# Patient Record
Sex: FEMALE | Employment: UNEMPLOYED | URBAN - METROPOLITAN AREA
[De-identification: names, ages, dates, MRNs, and addresses within clinical notes are randomized per-mention and may not be internally consistent; named-entity substitution may affect disease eponyms.]

---

## 2018-02-21 ENCOUNTER — OFFICE VISIT (OUTPATIENT)
Dept: SURGERY | Age: 43
End: 2018-02-21

## 2018-02-21 VITALS
BODY MASS INDEX: 21.34 KG/M2 | HEART RATE: 98 BPM | DIASTOLIC BLOOD PRESSURE: 53 MMHG | HEIGHT: 66 IN | WEIGHT: 132.8 LBS | SYSTOLIC BLOOD PRESSURE: 89 MMHG

## 2018-02-21 DIAGNOSIS — Z98.84 S/P BARIATRIC SURGERY: ICD-10-CM

## 2018-02-21 DIAGNOSIS — R10.11 RUQ ABDOMINAL PAIN: ICD-10-CM

## 2018-02-21 DIAGNOSIS — K90.9 INTESTINAL MALABSORPTION, UNSPECIFIED TYPE: ICD-10-CM

## 2018-02-21 DIAGNOSIS — R10.11 RUQ ABDOMINAL PAIN: Primary | ICD-10-CM

## 2018-02-21 DIAGNOSIS — R11.0 POSTPRANDIAL NAUSEA: ICD-10-CM

## 2018-02-21 RX ORDER — CYANOCOBALAMIN 1000 UG/ML
1000 INJECTION, SOLUTION INTRAMUSCULAR; SUBCUTANEOUS
Qty: 3 VIAL | Refills: 3 | Status: SHIPPED | OUTPATIENT
Start: 2018-02-21 | End: 2018-10-10 | Stop reason: SDUPTHER

## 2018-02-21 RX ORDER — IBUPROFEN 800 MG/1
800 TABLET ORAL
Qty: 60 TAB | Refills: 0 | Status: SHIPPED | OUTPATIENT
Start: 2018-02-21 | End: 2020-10-01

## 2018-02-21 NOTE — PATIENT INSTRUCTIONS
Patient Instructions      1. Remember hydration goals - minimum of 64 ounces of liquids per day (dehydration is the number one reason for hospital readmission). 2. Continue to monitor carbohydrate and protein intake you need a minimum of  Grams of protein daily- remember to keep your total carbohydrates to 50 grams or less per day for best results. 3. Continue to work towards exercise goals - 60-90 minutes, 5 times a week minimum of deliberate, aerobic exercise is the ultimate goal with strength training 2 times each week. Refer to [facility] for  information. 4. Remember to take vitamins as directed in your handbook. 5. Attend support group the 2nd Thursday of each month. 6. Use Miralax if you become constipated. 7. Call us at [phone] or email us through [email or web address] with questions,     concerns or worsening of condition, we have someone on call 24 hours a day. If you are unable to reach our office, you are to go to your Primary Care Physician or the Emergency Department. Supplement Resource Guide    Importance of Protein:   Maintains lean body mass, produces antibodies to fight off infections, heals wounds, minimizes hair loss, helps to give you energy, helps with satiety, and keeping you full between meals. Importance of Calcium:  Needed for healthy bones and teeth, normal blood clotting, and nervous system functioning, higher risk of osteoporosis and bone disease with non-compliance. Importance of Multivitamins: Many functions. Supply you with extra nutrients that you may be missing from food. May lead to iron deficiency anemia, weakness, fatigue, and many other symptoms with non-compliance. Importance of B Vitamins:  Important for red blood cell formation, metabolism, energy, and helps to maintain a healthy nervous system. Protein Supplement  Find one you like now. Use immediately after surgery. Look for:  35-50g protein each day from your protein supplement once you reach the progression diet. 0-3 g fat per serving  0-3 g sugar per serving    Protein drinks should be split in separate dosages. Recommend: Lifelong  1 year + Calcium Supplement:     Start taking within a month after surgery. Look for: Calcium Citrate Plus D (1500 mg per day)  Recommend: Citracal     .            Avoid chocolate chewable calcium. Can use chewable bariatric or GNC brand or similar chewable. The body cannot absorb more than 500-600 mg of calcium at a time. Take for Life Multi-vitamin Supplement:      Start immediately after surgery: any complete chewable, such as: [place]s Complete chewables. Avoid [place] sours or gummies. They lack iron and other important nutrients and also have added sugar. Continue with chewable vitamin or change to adult complete multivitamin one month after surgery. Menstruating women can take a prenatal vitamin. Make sure has at least 18 mg iron and 089-607 mcg folic acid   Vitamin I06, B Complex Vitamin, and Biotin  Start taking within a month after surgery. Vitamin B12:  1000 mcg of Vitamin B12 three times weekly    Must take sublingually (meaning you take it under your tongue) or in a liquid drop form for easy absorption. B Complex Vitamin: Take a pill or liquid drop form once daily. Biotin: This vitamin can help prevent hair loss.     Recommend 5mg   (5000 mcg) a day  Biotin is Optional

## 2018-02-21 NOTE — PROGRESS NOTES
Surgery Consult  Evaluation of RUQ pain    Subjective:     Margi Huitron is a 38 y.o. female with a history of laparoscopic sleeve gastrectomy on 8/1/14. She complains of ruq pain that started about one month ago. She states that she started her cycle, then noticed the pain, before her cycle was over she moved some furniture, then the pain was more intense the following day and she thought she had strained a muscle. Eating and drinking makes the pain worse and causes nausea. Motrin and Tylenol make the pain tolerable. She states that when she eats, it feels like her right side is bulging and when she rubs it the pain seems to move to her umbilical region. Pain is a 7/10 and feels like an electrical shock when she eats or twists her torso to the right side. When the symptoms first started, she developed diarrhea, but through the course of the last month, it has turned to constipation. Patient had developed a fear of eating or drinking due to the associated pain and nausea. Patient was seen at [facility] on 2/14/18 for this problem initially and was diagnosed with possible muscle strain. No imaging was preformed at the time. Patient has not been compliant with bariatric surgery follow up. Last office visit was 8/22/14. Patient Active Problem List    Diagnosis Date Noted    RUQ abdominal pain 02/21/2018    Postprandial nausea 02/21/2018    S/P bariatric surgery 08/11/2014    Intestinal malabsorption 08/11/2014    Arthritis     Chronic pain     Morbid obesity ([address]     Sleep apnea      Past Medical History:   Diagnosis Date    Arthritis     Chronic pain     lower pain    Diabetes ([address] 2013    GERD (gastroesophageal reflux disease)     hx of. not a problem now.  denies current symptoms    GERD (gastroesophageal reflux disease)     Morbid obesity (HCC)     Other ill-defined conditions(799.89)     sciatica    Other ill-defined conditions(799.89)     carpal tunnel    Other ill-defined conditions(799.89)     pinched nerve in cervical    Other ill-defined conditions(799.89)     fibromyalgia    Other ill-defined conditions(799.89)     h/o migraines    Psychiatric disorder     h/o anxiety, panic attack, depression    Sleep apnea     does not use c-pap      Past Surgical History:   Procedure Laterality Date    HX CARPAL TUNNEL RELEASE      bilateral    HX ORTHOPAEDIC      carpal tunnel bilateral    [place] LAP, JAQUAN RESTRICT PROC, LONGITUDINAL GASTRECTOMY  8/1/14    Sleeve by Dr Lincoln Rosales History   Substance Use Topics    Smoking status: Former Smoker     Packs/day: 0.25     Years: 5.00     Quit date: 11/1/2011    Smokeless tobacco: Never Used    Alcohol use No      Family History   Problem Relation Age of Onset    Malignant Hyperthermia Neg Hx     Pseudocholinesterase Deficiency Neg Hx     Delayed Awakening Neg Hx     Post-op Nausea/Vomiting Neg Hx     Post-op Cognitive Dysfunction Neg Hx     Emergence Delirium Neg Hx       Current Outpatient Prescriptions   Medication Sig    Syringe with Needle, Safety (3CC SAFETY SYRINGE 25GX5/8\") 3 mL 25 gauge x 5/8\" syrg 1 mL by Does Not Apply route every thirty (30) days.  cyanocobalamin (VITAMIN B-12) 1,000 mcg/mL injection 1 mL by SubCUTAneous route every thirty (30) days. Indications: PREVENTION OF VITAMIN B12 DEFICIENCY    ibuprofen (MOTRIN) 800 mg tablet Take 1 Tab by mouth every six (6) hours as needed for Pain.  ondansetron (ZOFRAN ODT) 4 mg disintegrating tablet Take 1 tablet by mouth every eight (8) hours as needed for Nausea.  cyanocobalamin (VITAMIN B12) 1,000 mcg/mL injection 1 mL by SubCUTAneous route every thirty (30) days.  flintstones complete (FLINTSTONES COMPLETE) chewable tablet Take 1 Tab by mouth daily.  zolpidem (AMBIEN) 5 mg tablet Take 5 mg by mouth nightly. No current facility-administered medications for this visit.        Allergies   Allergen Reactions    Latex Hives    Penicillins Hives    Vicodin [Hydrocodone-Acetaminophen] Swelling     throat        Review of Systems:  Positive in BOLD    CONST: Fever, weight loss, fatigue or chills  GI: Nausea, vomiting, abdominal pain, change in bowel habits, hematochezia, melena, and GERD   INTEG: Dermatitis, abnormal moles  HEENT: Recent changes in vision, vertigo, epistaxis, dysphagia and hoarseness  CV: Chest pain, palpitations, HTN, edema and varicosities  RESP: Cough, shortness of breath, wheezing, hemoptysis, snoring and reactive airway disease  : Hematuria, dysuria, frequency, urgency, nocturia and stress urinary incontinence   MS: Weakness, joint pain and arthritis  ENDO: Diabetes, thyroid disease, polyuria, polydipsia, polyphagia, poor wound healing, heat intolerance, cold intolerance  LYMPH/HEME: Anemia, bruising and history of blood transfusions  NEURO: Dizziness, headache, fainting, seizures and stroke  PSYCH: Anxiety and depression    Objective:     Visit Vitals    BP (!) 89/53 (BP 1 Location: Left arm, BP Patient Position: Sitting)    Pulse 98    Ht 5' 6\" (1.676 m)    Wt 60.2 kg (132 lb 12.8 oz)    BMI 21.43 kg/m2       Physical Exam:      GENERAL: alert, cooperative, no distress, appears stated age  EYE:conjunctivae and sclerae normal, pupils equal, round, reactive to light, extraocular movements intact without nystagmus  THROAT & NECK: normal, neck soft and supple, no lymphadenopathy  LUNG: clear to auscultation bilaterally  HEART: Regular rate and rhythm or without murmur or extra heart sounds  ABDOMEN:Davis sign not present, moderate tenderness in the in the RUQ., no CVA tenderness, no hernias noted  EXTREMITIES:  extremities normal, atraumatic, no cyanosis or edema  SKIN: Normal.    Imaging and Lab Review:   Findings of ultrasound of the abdomen: pending. No results found for this or any previous visit (from the past 24 hour(s)).         Assessment:   RUQ pain   Postprandial nausea    Plan:   Labs   US of RUQ to assess gallbladder  Will order HIDA scan with [facility] if US is normal  Will call patient with results    Patient is to return to clinic if symptoms worsen or if she has any questions or concerns. If clinic is closed, she is to proceed to the nearest emergency department.      Signed By: Giana Foy, [id number]     February 21, 2018

## 2018-10-10 ENCOUNTER — TELEPHONE (OUTPATIENT)
Dept: SURGERY | Age: 43
End: 2018-10-10

## 2018-10-10 DIAGNOSIS — K90.9 INTESTINAL MALABSORPTION, UNSPECIFIED TYPE: ICD-10-CM

## 2018-10-10 DIAGNOSIS — R11.0 POSTPRANDIAL NAUSEA: ICD-10-CM

## 2018-10-10 DIAGNOSIS — Z98.84 S/P BARIATRIC SURGERY: ICD-10-CM

## 2018-10-10 DIAGNOSIS — R10.11 RUQ ABDOMINAL PAIN: ICD-10-CM

## 2018-10-10 RX ORDER — CYANOCOBALAMIN 1000 UG/ML
1000 INJECTION, SOLUTION INTRAMUSCULAR; SUBCUTANEOUS
Qty: 3 VIAL | Refills: 3
Start: 2018-10-10 | End: 2019-03-08

## 2019-03-07 ENCOUNTER — TELEPHONE (OUTPATIENT)
Dept: SURGERY | Age: 44
End: 2019-03-07

## 2019-03-07 NOTE — TELEPHONE ENCOUNTER
Chun Merritt please advise:    Patient reported severe right sided abdomial pain. Described constant pulling pain with no relief with prescribed or OTC medications. Recent EGD and Colonoscopy performed and patient advised to f/u with this office. Patient does still have her gallbladder. Scheduled for office visit 3/8/19 @ 11:30.

## 2019-03-07 NOTE — TELEPHONE ENCOUNTER
Please contact patient and tell her that if she has worsening of her symptoms overnight, she should go to the ED at [facility]. Otherwise we will see her tomorrow at her appt.

## 2019-03-08 ENCOUNTER — OFFICE VISIT (OUTPATIENT)
Dept: SURGERY | Age: 44
End: 2019-03-08

## 2019-03-08 VITALS
TEMPERATURE: 98 F | OXYGEN SATURATION: 100 % | BODY MASS INDEX: 23.38 KG/M2 | WEIGHT: 145.5 LBS | HEIGHT: 66 IN | SYSTOLIC BLOOD PRESSURE: 107 MMHG | DIASTOLIC BLOOD PRESSURE: 71 MMHG | HEART RATE: 72 BPM

## 2019-03-08 DIAGNOSIS — K90.9 INTESTINAL MALABSORPTION, UNSPECIFIED TYPE: Primary | ICD-10-CM

## 2019-03-08 DIAGNOSIS — R19.8 POSITIVE MURPHY'S SIGN: ICD-10-CM

## 2019-03-08 DIAGNOSIS — Z98.84 S/P BARIATRIC SURGERY: ICD-10-CM

## 2019-03-08 DIAGNOSIS — B37.31 CANDIDIASIS OF VAGINA: ICD-10-CM

## 2019-03-08 RX ORDER — CYANOCOBALAMIN 1000 UG/ML
1000 INJECTION, SOLUTION INTRAMUSCULAR; SUBCUTANEOUS
Qty: 3 VIAL | Refills: 3 | Status: SHIPPED | OUTPATIENT
Start: 2019-03-08 | End: 2020-10-01

## 2019-03-08 RX ORDER — FLUCONAZOLE 150 MG/1
150 TABLET ORAL DAILY
Qty: 1 TAB | Refills: 1 | Status: SHIPPED | OUTPATIENT
Start: 2019-03-08 | End: 2019-03-09

## 2019-03-08 NOTE — PATIENT INSTRUCTIONS
Patient Instructions      1. Remember hydration goals - minimum of 64 ounces of liquids per day (dehydration is the number one reason for hospital readmission). 2. Sleep 7-9 hours each night to keep your metabolism up. 3. Continue to monitor carbohydrate and protein intake you need a minimum of  Grams of protein daily- remember to keep your total carbohydrates to 50 grams or less per day for best results. 4. To maximize weight loss keep your caloric intake between 800-1,200 calories daily. If you are exercising excessively, such as training for a marathon, you need to keep a food log and meet with the dietician so they can advise you on your diet choices, carbohydrate intake and caloric intake. 5. Continue to work towards exercise goals - 60-90 minutes, 5 times a week minimum of deliberate, aerobic exercise is the ultimate goal with strength training 2 times each week. Refer to [facility] for  information. 6. Remember to take vitamins as directed in your handbook. 7. Attend support group the 2nd Thursday of each month. 8. Constipation: Milk of Magnesia is for immediate relief only. Miralax is to be used every day if constipation is a chronic problem. 9. Diarrhea: patients will occasionally develop lactose intolerance after surgery. Check to see if your protein shake has whey in it. If it does try a protein powder or drink that does not have whey and stop all yogurts, cheeses and milks to see if the diarrhea goes away. 10. If you have had labs drawn. We will only call you if you have abnormal results. Otherwise you can access the lab results in \"[facility]\". You will only need the access code the first time you sign on. 11. Call us at [phone] or email us through [email or web address]" with questions,     concerns or worsening of condition, we have someone on call 24 hours a day.   If you are unable to reach our office, you are to go to your Primary Care Physician or the Emergency Department. Supplement Resource Guide    Importance of Protein:   Maintains lean body mass, produces antibodies to fight off infections, heals wounds, minimizes hair loss, helps to give you energy, helps with satiety, and keeping you full between meals. Importance of Calcium:  Needed for healthy bones and teeth, normal blood clotting, and nervous system functioning, higher risk of osteoporosis and bone disease with non-compliance. Importance of Multivitamins: Many functions. Supply you with extra nutrients that you may be missing from food. May lead to iron deficiency anemia, weakness, fatigue, and many other symptoms with non-compliance. Importance of B Vitamins:  Important for red blood cell formation, metabolism, energy, and helps to maintain a healthy nervous system. Protein Supplement  Liquid diet phase: consume 90-100g protein daily. Once you are eating consume  35-50g protein each day from your protein supplement. 0-3 g fat per serving  0-3 g sugar per serving    The body can only absorb 30g of protein at one time, so do not consume more than that at one time. Recommend: Lifelong use Multi-vitamin Supplement:      Start immediately after surgery: any complete chewable, such as: [place]s Complete chewables. Avoid [place] sours or gummies. They lack iron and other important nutrients and also have added sugar. Continue with a chewable vitamin or change to an adult complete multivitamin one month after surgery. Menstruating women can take a prenatal vitamin. Make sure it has at least 18 mg iron and 118-269 mcg folic acid Calcium Supplement:     Start taking within one month after surgery. Look for:   Calcium Citrate Plus D (1500 mg per day)    Recommend: Citracal    Avoid chocolate chewable calcium. Can use chewable bariatric or [employer]NC brand or similar chewable.     The body cannot absorb more than 500-600 mg of calcium at one time. Take for Life     Vitamin B12  B Complex Vitamin    Start taking both within one month after surgery. Vitamin B12 (sublingual): Take 1000 mcg of Vitamin B12 three times weekly    Must take sublingually (meaning you put it under your tongue) or in a liquid drop form for easy absorption. B Complex Vitamin:   Take one pill daily or liquid drop form daily; as directed on bottle.      Take for Life

## 2019-03-08 NOTE — PROGRESS NOTES
Subjective:      David Benitez is a 45 y.o. female is now 4.5 years status post laparoscopic sleeve gastrectomy. Doing well overall. She has lost a total of 86 pounds since surgery. Body mass index is 23.48 kg/m². Has lost 91% of EBW. Currently on a solid food diet without difficulty, reports no issues regarding her weight loss surgery and denies vomiting. Taking in 40oz water daily. Sources of protein include chicken and fish. 60 min of activity 3 days a week, including burpees, russian twists and planks. Patient is sleeping 4-5 hours a night on average. Patient has been having RUQ pain for over a year. She is back today with the same complaints. Pain is constant cramping in RUQ that will worsen with eating or bending over. Nothing makes the pain better. She states that when it worsens it feels like an electric shock in the area. Patient denies trauma to the area. At her last visit in Feb 2018 I had ordered an US of her gall bladder, but she did not complete the study. She did have multiple studies completed at [facility] in June and July of 2018, see results below. A HIDA scan has not been completed. Patient states that she has had a chronic vaginal yeast infection. She will treat it and it will go away for about a month, then it will come right back. She states that she has one now. She has tried a sitz bath in warm water with white vinegar with no relief. She states she is going to call her GYN. Bowel movements are constipated, she has a stool softener, but has not been using it. The patient is not compliant with multivitamins, calcium, Vit D and B12 supplements.      Weight Loss Metrics 3/8/2019 2/21/2018 8/30/2014 8/28/2014 8/21/2014 8/15/2014 8/1/2014   Today's Wt 145 lb 8 oz 132 lb 12.8 oz 212 lb 214 lb 216 lb 217 lb 226 lb   BMI 23.48 kg/m2 21.43 kg/m2 34.23 kg/m2 34.56 kg/m2 34.88 kg/m2 35.04 kg/m2 36.49 kg/m2          Patient Active Problem List   Diagnosis Code    Arthritis M19.90    Chronic pain G89.29    Morbid obesity (HCC) E66.01    Sleep apnea G47.30    S/P bariatric surgery Z98.84    Intestinal malabsorption K90.9    RUQ abdominal pain R10.11    Postprandial nausea R11.0    Vaginal candidiasis B37.3        Past Medical History:   Diagnosis Date    Arthritis     Chronic pain     lower pain    Diabetes ([facility] [address] 2013    GERD (gastroesophageal reflux disease)     hx of. not a problem now. denies current symptoms    GERD (gastroesophageal reflux disease)     Morbid obesity (HCC)     Other ill-defined conditions(799.89)     sciatica    Other ill-defined conditions(799.89)     carpal tunnel    Other ill-defined conditions(799.89)     pinched nerve in cervical    Other ill-defined conditions(799.89)     fibromyalgia    Other ill-defined conditions(799.89)     h/o migraines    Psychiatric disorder     h/o anxiety, panic attack, depression    Sleep apnea     does not use c-pap       Past Surgical History:   Procedure Laterality Date    HX CARPAL TUNNEL RELEASE      bilateral    HX ORTHOPAEDIC      carpal tunnel bilateral    MD LAP, JAQUAN RESTRICT PROC, LONGITUDINAL GASTRECTOMY  8/1/14    Sleeve by Dr Sasha Harris       Current Outpatient Medications   Medication Sig Dispense Refill    B.infantis-B.ani-B.long-B.bifi (PROBIOTIC 4X) 10-15 mg TbEC Take  by mouth.  cyanocobalamin (VITAMIN B-12) 1,000 mcg/mL injection 1 mL by SubCUTAneous route every thirty (30) days. 3 Vial 3    Syringe with Needle, Safety (3CC SAFETY SYRINGE 25GX5/8\") 3 mL 25 gauge x 5/8\" syrg 1 mL by Does Not Apply route every thirty (30) days. 3 Pen Needle 3    fluconazole (DIFLUCAN) 150 mg tablet Take 1 Tab by mouth daily for 1 day. FDA advises cautious prescribing of oral fluconazole in pregnancy. 1 Tab 1    ibuprofen (MOTRIN) 800 mg tablet Take 1 Tab by mouth every six (6) hours as needed for Pain.  60 Tab 0       Allergies   Allergen Reactions    Latex Hives    Penicillins Hives    Vicodin [Hydrocodone-Acetaminophen] Swelling     throat       Review of Systems:  General - No history or complaints of unexpected fever or chills  Head/Neck - No history or complaints of headache or dizziness  Cardiac - No history or complaints of chest pain, palpitations, or shortness of breath  Pulmonary - No history or complaints of shortness of breath or productive cough  Gastrointestinal - as noted above  Genitourinary - No history or complaints of hematuria/dysuria or renal lithiasis  Musculoskeletal - No history or complaints of joint  muscular weakness  Hematologic - No history of any bleeding episodes  Neurologic - No history or complaints of  migraine headaches or neurologic symptoms    Objective:     Visit Vitals  /71 (BP 1 Location: Left arm, BP Patient Position: Sitting)   Pulse 72   Temp 98 °F (36.7 °C)   Ht 5' 6\" (1.676 m)   Wt 66 kg (145 lb 8 oz)   SpO2 100%   BMI 23.48 kg/m²       General:  alert, cooperative, no distress, appears stated age   Chest: lungs clear to auscultation, breath sounds equal and symmetric, no rhonchi, rales or wheezes, no accessory muscle use   Cor:   Regular rate and rhythm or without murmur or extra heart sounds   Abdomen:  positive davis sign, soft, bowel sounds active, no masses or organomegaly   Incisions:   healed well     7/16/18  [facility]    Indication:  Upper abdominal pain; R10.10     Limited abdomen ultrasound was performed. Pancreas:  Partially obscured by bowel gas. Normal echotexture where visualized. Liver: Normal echotexture. 15.2 cm long axis. Color doppler evaluation shows hepatopedal flow in the portal vein. Gallbladder: Normal. The technologist reports a negative sonographic Davis's sign. Common Hepatic Duct: 5 mm transverse. Right Kidney: Normal echotexture. 9.8 cm long axis. No hydronephrosis. IMPRESSION  Impression:    1. Normal abdomen ultrasound limited.     6/4/18 CT  ABDOMEN:    Exam limited by noncontrast technique. Lung bases: Clear. Liver:Normal.    Gallbladder/Biliary: No evidence of opaque gallstones or inflammatory change. No ductal dilatation. Spleen: Normal.    Pancreas: Normal.    Adrenal glands: Normal.    Kidneys: No hydronephrosis. No obstructing stones. Subcentimeter hypodensity noted in the posterior mid left kidney, poorly characterized due to size and lack of contrast.. Retroperitoneum: No adenopathy. Peritoneum: No free fluid ,free air or masses. Vascular: No aortic aneurysm. Stomach: Status post sleeve gastrectomy. Small bowel/Mesentery: No mesenteric mass or adenopathy. No bowel dilatation/obstruction. Colon: No inflammatory change. PELVIC CT:    :Distal ureters are poorly visualized. No conclusive distal ureteral or bladder stones. 2.5 cm left adnexal cyst, likely an enlarged functional cyst..    Adenopathy: None identified. Bowel/Colon: No gross inflammatory change. Appendix: Poorly visualized. Portions of the appendix appear gas-filled with no conclusive enlargement or inflammatory change. Ascites: None identified. MUSCULOSKELETAL/ABDOMINAL WALL:    Mild lumbar scoliosis. No acute bony abnormality. IMPRESSION  Impression:  1. Limited noncontrast exam.  2.  No evidence of acute process in the abdomen or pelvis. 3.  Left adnexal cyst.  This is probably a mildly prominent functional cyst.  Pelvic sonography may be of benefit if there are any specific pelvic symptoms. Assessment:   History of Morbid obesity, status post laparoscopic sleeve gastrectomy. Doing well postoperatively. RUQ pain - HIDA scan ordered  Resume vitamin supplementation - list of required vitamins given today  Sleep goal is 7-9 hours each night. Patient education given on the effects of sleep deprivation on weight control. Maintain hydration by drinking small amounts of clear fluids frequently, then soft diet, and then advance diet as tolerated.  May use OTC Imodium if desired for any diarrhea. Call if symptoms worsen, high fever, severe weakness or fainting, increased abdominal pain, blood in stool or vomit, or failure to improve in 2-3 days. Vaginal candidiasis - diflucan given and f/u with GYN    Plan:     1. Increase activity to the goal of 30 minutes daily  2. Discussed patients weight loss goals and dietary choices in relation to goals. 3. Sleep goal is 7-9 hours each night. Patient education given on the effects of sleep deprivation on weight control. 4. Reminded to measure portions, continue high protein, low carbohydrate diet. Reminded to eat regularly, to eat slowly & not to drink with meals. 5. Continue vitamin supplementation  6. Continue current medications and follow up with PCP for management of regimen. 7. Continue cardio exercise and add resistance exercises. 60-90 minutes of aerobic activity 5 days a week and strength training 2 days each week. 8. Encouraged to attend support group   9. Patient to complete labs before next visit. Lab slip given today. 10. I have discussed this plan with patient and they verbalized understanding  11. Follow up in 1 year or sooner if patient has questions, concerns or worsening of condition, if unable to reach our office, patient should report to the ED. 15. Ms. Lazaro Jordan has a reminder for a \"due or due soon\" health maintenance. I have asked that she contact her primary care provider for a follow-up on this health maintenance.

## 2019-03-26 ENCOUNTER — HOSPITAL ENCOUNTER (OUTPATIENT)
Dept: NUCLEAR MEDICINE | Age: 44
Discharge: HOME | End: 2019-03-26
Attending: PHYSICIAN ASSISTANT
Payer: MEDICAID

## 2019-03-26 VITALS — BODY MASS INDEX: 22.92 KG/M2 | WEIGHT: 142 LBS

## 2019-03-26 DIAGNOSIS — K90.9 INTESTINAL MALABSORPTION, UNSPECIFIED TYPE: ICD-10-CM

## 2019-03-26 DIAGNOSIS — Z98.84 S/P BARIATRIC SURGERY: ICD-10-CM

## 2019-03-26 DIAGNOSIS — R19.8 POSITIVE MURPHY'S SIGN: ICD-10-CM

## 2019-03-26 PROCEDURE — 74011000250 HC RX REV CODE- 250: Performed by: PHYSICIAN ASSISTANT

## 2019-03-26 PROCEDURE — 78227 HEPATOBIL SYST IMAGE W/DRUG: CPT

## 2019-03-26 PROCEDURE — 74011250636 HC RX REV CODE- 250/636: Performed by: PHYSICIAN ASSISTANT

## 2019-03-26 RX ORDER — WATER FOR INJECTION,STERILE
VIAL (ML) INJECTION
Status: COMPLETED | OUTPATIENT
Start: 2019-03-26 | End: 2019-03-26

## 2019-03-26 RX ADMIN — SINCALIDE 1.29 MCG: 5 INJECTION, POWDER, LYOPHILIZED, FOR SOLUTION INTRAVENOUS at 08:34

## 2019-03-26 RX ADMIN — WATER 5 ML: 1 INJECTION INTRAMUSCULAR; INTRAVENOUS; SUBCUTANEOUS at 08:33

## 2019-03-27 ENCOUNTER — TELEPHONE (OUTPATIENT)
Dept: SURGERY | Age: 44
End: 2019-03-27

## 2019-03-27 NOTE — TELEPHONE ENCOUNTER
I called patient and reviewed the results of her HIDA scan which were normal with a 52% ejection fraction of her gall bladder. She states that she was symptomatic with administration of CCK. She states that she had cramping and what felt like bloating. I spoke to Dr. Patricia Miller about the results of all of her recent studies and he said to have patient make an appt with him if she had symptoms during the HIDA scan. Patient will call back tomorrow to make an appt with him. Patient is to contact our office with any problems, worsening of condition, questions or concerns. If we are not available or unable to be reached, patient is to proceed to the Emergency Department.

## 2019-05-22 DIAGNOSIS — K90.9 INTESTINAL MALABSORPTION, UNSPECIFIED TYPE: ICD-10-CM

## 2019-05-22 DIAGNOSIS — Z98.84 S/P BARIATRIC SURGERY: ICD-10-CM

## 2020-07-16 ENCOUNTER — DOCUMENTATION ONLY (OUTPATIENT)
Dept: SURGERY | Age: 45
End: 2020-07-16

## 2020-07-16 NOTE — LETTER
[employer] [facility] Specialist 
[address] [facility], [address] [facility] Surgical Specialists [facility] 
 
 
Dear Patient, Your health is our main concern. It is important for your health to have follow-up lab work and to see your surgeon at 2 months, 4 months, 6 months, 9 months and annually after your weight loss surgery. Additionally, the Department of Bariatric Surgery at our hospital is a member of the [address] [place] [address] Surgical Quality Improvement Program ([facility] NSQIP). As a participant in this program, we gather information on the outcomes of our patients after surgery. Please call the office for a follow up appointment at [phone]. If you have moved out of the area or have changed surgeons please call us and let us know the name of your doctor. Your health and feedback are important to us. We greatly appreciate your response. Thank you, [facility]

## 2020-09-30 ENCOUNTER — TELEPHONE (OUTPATIENT)
Dept: SURGERY | Age: 45
End: 2020-09-30

## 2020-10-01 ENCOUNTER — VIRTUAL VISIT (OUTPATIENT)
Dept: SURGERY | Age: 45
End: 2020-10-01
Payer: MEDICAID

## 2020-10-01 VITALS — HEIGHT: 66 IN | BODY MASS INDEX: 25.23 KG/M2 | WEIGHT: 157 LBS

## 2020-10-01 DIAGNOSIS — Z98.84 S/P BARIATRIC SURGERY: ICD-10-CM

## 2020-10-01 DIAGNOSIS — R10.11 RUQ PAIN: ICD-10-CM

## 2020-10-01 DIAGNOSIS — E55.9 HYPOVITAMINOSIS D: ICD-10-CM

## 2020-10-01 DIAGNOSIS — K90.9 INTESTINAL MALABSORPTION, UNSPECIFIED TYPE: Primary | ICD-10-CM

## 2020-10-01 DIAGNOSIS — K90.9 INTESTINAL MALABSORPTION, UNSPECIFIED TYPE: ICD-10-CM

## 2020-10-01 PROCEDURE — 99214 OFFICE O/P EST MOD 30 MIN: CPT | Performed by: PHYSICIAN ASSISTANT

## 2020-10-01 RX ORDER — CYANOCOBALAMIN 1000 UG/ML
1000 INJECTION, SOLUTION INTRAMUSCULAR; SUBCUTANEOUS
Qty: 3 VIAL | Refills: 3 | Status: SHIPPED | OUTPATIENT
Start: 2020-10-01

## 2020-10-01 RX ORDER — DIPHENHYDRAMINE HCL 25 MG
25 CAPSULE ORAL
COMMUNITY

## 2020-10-01 RX ORDER — ACETAMINOPHEN 325 MG/1
325 TABLET ORAL
COMMUNITY

## 2020-10-01 RX ORDER — SYRINGE,SAFETY WITH NEEDLE,3ML 25GX5/8"
1 SYRINGE, EMPTY DISPOSABLE MISCELLANEOUS
Qty: 3 PEN NEEDLE | Refills: 3 | Status: SHIPPED | OUTPATIENT
Start: 2020-10-01

## 2020-10-01 NOTE — PATIENT INSTRUCTIONS
Patient Instructions 1. Remember hydration goals - minimum of 64 ounces of liquids per day (dehydration is the number one reason for hospital readmission). 2. Sleep 7-9 hours each night to keep your metabolism up. 3. Continue to monitor carbohydrate and protein intake you need a minimum of  Grams of protein daily- remember to keep your total carbohydrates to 50 grams or less per day for best results. 4. To maximize weight loss keep your caloric intake between 800-1,200 calories daily. If you are exercising excessively, such as training for a marathon, you need to keep a food log and meet with the dietician so they can advise you on your diet choices, carbohydrate intake and caloric intake. 5. Continue to work towards exercise goals - 60-90 minutes, 5 times a week minimum of deliberate, aerobic exercise is the ultimate goal with strength training 2 times each week. Refer to [employer] for  information. 6. Remember to take vitamins as directed in your handbook. 7. Attend support group the 2nd Thursday of each month. 8. Constipation: Milk of Magnesia is for immediate relief only. Miralax is to be used every day if constipation is a chronic problem. 9. Diarrhea: patients will occasionally develop lactose intolerance after surgery. Check to see if your protein shake has whey in it. If it does try a protein powder or drink that does not have whey and stop all yogurts, cheeses and milks to see if the diarrhea goes away. 10. If you have had labs drawn. We will only call you if you have abnormal results. Otherwise you can access the lab results in \"[employer]\". You will only need the access code the first time you sign on.    
11. Call us at [phone] or email us through [email or web address]" with questions,     concerns or worsening of condition, we have someone on call 24 hours a day. If you are unable to reach our office, you are to go to your Primary Care Physician or the Emergency Department. NOTE TO GASTRIC BYPASS PATIENTS:  (SAME APPLIES TO GASTRIC SLEEVE PATIENTS FOR FIRST TWO MONTHS) Remember that for the rest of your life, you are not able to take the following: 
- NSAIDs (ibuprofen, goody powder, BC powder, Motrin, Advil, Mobic, Voltaren, Excedrin, etc.) - Steroid pills or injections - Smoke (cigarettes or recreational drugs) - Alcohol Use of any of the above may cause ulcers in your stomach which may perforate causing a medical emergency and surgery. Speak to our medical staff if another medical provider requires you to take steroids or NSAIDs. Supplement Resource Guide Importance of Protein:  
Maintains lean body mass, produces antibodies to fight off infections, heals wounds, minimizes hair loss, helps to give you energy, helps with satiety, and keeping you full between meals. Importance of Calcium: 
Needed for healthy bones and teeth, normal blood clotting, and nervous system functioning, higher risk of osteoporosis and bone disease with non-compliance. Importance of Multivitamins: Many functions. Supply you with extra nutrients that you may be missing from food. May lead to iron deficiency anemia, weakness, fatigue, and many other symptoms with non-compliance. Importance of B Vitamins: 
Important for red blood cell formation, metabolism, energy, and helps to maintain a healthy nervous system. Protein Supplement Liquid diet phase: consume 90-100g protein daily. Once you are eating consume 35-50g protein each day from your protein supplement. 0-3 g fat per serving 0-3 g sugar per serving The body can only absorb 30g of protein at one time, so do not consume more than that at one time. Multi-vitamin Supplement:   
Start immediately after surgery: any complete chewable, such as: [place]s Complete chewables. Avoid [place] sours or gummies. They lack iron and other important nutrients and also have added sugar. Continue with a chewable vitamin or change to an adult complete multivitamin one month after surgery. Menstruating women can take a prenatal vitamin. Make sure it has at least 18 mg iron and 645-836 mcg folic acid Calcium Supplement:  
 
Start taking within one month after surgery. Look for:  
Calcium Citrate Plus D (1500 mg per day) Recommend: Citracal 
 
Avoid chocolate chewable calcium. Can use chewable bariatric or GNC brand or similar chewable. The body cannot absorb more than 500-600 mg of calcium at one time. Take for Life Vitamin D Take 3,000 international units daily Vitamin B12 B Complex Vitamin Start taking both within one month after surgery. Vitamin B12 (sublingual): Take 1000 mcg of Vitamin B12 three times weekly Must take sublingually (meaning you put it under your tongue) or in a liquid drop form for easy absorption. B Complex Vitamin:  
Take one pill daily or liquid drop form daily; as directed on bottle. Take for Life

## 2020-10-01 NOTE — PROGRESS NOTES
Subjective:      Myles Montejo is a 39 y.o. female is now 6 years status post laparoscopic sleeve gastrectomy. Doing well overall. She has lost a total of 75 pounds since surgery. Body mass index is 25.34 kg/m². Has lost 79% of EBW. Currently on a solid food diet with difficulty, reports vomiting and RUQ pain and denies . Taking in plenty of water daily. Sources of protein include meats. Patient states that she has been having postprandial n/v/d. She states she recently had a HIDA scan completed which showed an EF of 52%. Her last US of the abdomen was completed in 2018. Bowel movements are diarrhea. The patient is compliant with multivitamins, calcium, Vit D and B12 supplements. Weight Loss Metrics 10/1/2020 3/26/2019 3/8/2019 2/21/2018 8/30/2014 8/28/2014 8/21/2014   Today's Wt 157 lb 142 lb 145 lb 8 oz 132 lb 12.8 oz 212 lb 214 lb 216 lb   BMI 25.34 kg/m2 22.92 kg/m2 23.48 kg/m2 21.43 kg/m2 34.23 kg/m2 34.56 kg/m2 34.88 kg/m2        Patient Active Problem List   Diagnosis Code    Arthritis M19.90    Chronic pain G89.29    Morbid obesity ([address] E66.01    Sleep apnea G47.30    S/P bariatric surgery Z98.84    Intestinal malabsorption K90.9    RUQ abdominal pain R10.11    Postprandial nausea R11.0    Vaginal candidiasis B37.3        Past Medical History:   Diagnosis Date    Arthritis     Chronic pain     lower pain    Diabetes ([facility] [address] 2013    GERD (gastroesophageal reflux disease)     hx of. not a problem now.  denies current symptoms    GERD (gastroesophageal reflux disease)     Morbid obesity (HCC)     Other ill-defined conditions(799.89)     sciatica    Other ill-defined conditions(799.89)     carpal tunnel    Other ill-defined conditions(799.89)     pinched nerve in cervical    Other ill-defined conditions(799.89)     fibromyalgia    Other ill-defined conditions(799.89)     h/o migraines    Psychiatric disorder     h/o anxiety, panic attack, depression    Sleep apnea does not use c-pap       Past Surgical History:   Procedure Laterality Date    HX CARPAL TUNNEL RELEASE      bilateral    HX ORTHOPAEDIC      carpal tunnel bilateral    NV LAP, JAQUAN RESTRICT PROC, LONGITUDINAL GASTRECTOMY  8/1/14    Sleeve by Dr Zabrina Mills       Current Outpatient Medications   Medication Sig Dispense Refill    acetaminophen (TylenoL) 325 mg tablet Take 325 mg by mouth every four (4) hours as needed for Pain.  diphenhydrAMINE (BenadryL) 25 mg capsule Take 25 mg by mouth every six (6) hours as needed.  cyanocobalamin (Vitamin B-12) 1,000 mcg/mL injection 1 mL by SubCUTAneous route every thirty (30) days. Indications: prevention of vitamin B12 deficiency 3 Vial 3    Syringe with Needle, Safety (3cc Safety Syringe 25Gx5/8\") 3 mL 25 gauge x 5/8\" syrg 1 mL by Does Not Apply route every thirty (30) days.  3 Pen Needle 3       Allergies   Allergen Reactions    Latex Hives    Penicillins Hives    Vicodin [Hydrocodone-Acetaminophen] Swelling     throat       Review of Systems:  General - No history or complaints of unexpected fever or chills  Head/Neck - No history or complaints of headache or dizziness  Cardiac - No history or complaints of chest pain, palpitations, or shortness of breath  Pulmonary - No history or complaints of shortness of breath or productive cough  Gastrointestinal - as noted above  Genitourinary - No history or complaints of hematuria/dysuria or renal lithiasis  Musculoskeletal - No history or complaints of joint  muscular weakness  Hematologic - No history of any bleeding episodes  Neurologic - No history or complaints of  migraine headaches or neurologic symptoms    Objective:     Visit Vitals  Ht 5' 6\" (1.676 m)   Wt 71.2 kg (157 lb)   BMI 25.34 kg/m²       Physical Examination: General appearance - alert, well appearing, and in no distress and oriented to person, place, and time  Mental status - alert, oriented to person, place, and time, normal mood, behavior, speech, dress, motor activity, and thought processes  Eyes - pupils equal and reactive, extraocular eye movements intact, sclera anicteric, left eye normal, right eye normal  Ears - right ear normal, left ear normal  Neck- good extension and flexion, no obvious swelling  Chest - good air movement  Heart - N/A  Abdomen - no obvious distension, scars as noted:   Neurological - alert, oriented, normal speech, no focal findings or movement disorder noted  Musculoskeletal - no swelling noted  Extremities - normal movement        Assessment and Plan   1. Intestinal malabsorption  - Continue required Vitamins: B12, B complex, D, iron, calcium, multivitamin  2. S/p laparoscopic bariatric surgery, SLEEVE GASTRECTOMY, history of morbid obesity  - Sleep goal is 7-9 hours each night. Patient education given on the effects of sleep deprivation on weight control.  - Discussed patients weight loss goals and dietary choices in relation to goals. - Reminded to measure portions, continue high protein, low carbohydrate diet. Reminded to eat regularly, to eat slowly & not to drink with meals.    - Continue cardio exercise and add resistance exercises. 60-90 minutes of aerobic activity 5 days a week and strength training 2 days each week. - Encouraged to attend support group   - Required fluid intake is >64oz daily of sugar free beverages. 3. RUQ pain with associated n/v/d   - US of abdomen      Labs ordered today  Follow up in 6 months or sooner if patient has questions, concerns or worsening of condition. If unable to reach our office and receive immediate care, patient should go to the Emergency Department immediately. Ms. Binta Stockton has a reminder for a \"due or due soon\" health maintenance. I have asked that she contact her primary care provider for a follow-up on this health maintenance.      This visit with Danelle Maldonado was performed under virtual telemedicine guidelines during the coronavirus (JJWJP-65) public health emergency on October 1, 2020   in an interactive fashion using Doxy. me. They understand that this telemedicine encounter is a billable service, with coverage determined by their insurance carrier. They are aware that they may receive a bill and have provided verbal consent for this virtual visit. This visit was performed with the patient in their home environment and provider was present at [place] [address] [facility] at [facility].  I have spent over 30 minutes on this visit  both prior to the visit reviewing the patients chart and with the patient face to face. I have reviewed their medical history, performed a telemedicine physical examination, and discussed the plan of action to date. They understand that they will be asked to come to the office when our office is allowed normal patient interaction, as dictated by public health officials, for a face-to-face visit to rediscuss all of the things we have talked about today.

## 2021-07-12 ENCOUNTER — DOCUMENTATION ONLY (OUTPATIENT)
Dept: SURGERY | Age: 46
End: 2021-07-12

## 2021-07-12 NOTE — PROGRESS NOTES
Per [facility] requirements;  E-mail and letter sent for follow up appointment. [facility] Surgical Specialist  [address] [place], [address] [facility]                 [facility] [place]  [facility] Surgical Specialists  [facility]      Dear Patient,    Your health is our main concern. It is important for your health to have follow-up lab work and to see your surgeon at 2 months, 4 months, 6 months, 9 months and annually after your weight loss surgery. Additionally, the Department of Bariatric Surgery at our hospital is a member of the Metabolic and Bariatric Surgery Accreditation and Quality Improvement Program [facility]). As a participant in this program, we gather information on the outcomes of our patients after surgery. Please call the office for a follow up appointment at [phone]. If you have moved out of the area or have changed surgeons please call us and let us know the name of your doctor. Your health and feedback are important to us. We greatly appreciate your response.        Thank you,  [facility] [place] [facility] [facility]

## 2021-11-17 ENCOUNTER — OFFICE VISIT (OUTPATIENT)
Dept: SURGERY | Age: 46
End: 2021-11-17
Payer: MEDICAID

## 2021-11-17 VITALS
DIASTOLIC BLOOD PRESSURE: 85 MMHG | HEART RATE: 84 BPM | SYSTOLIC BLOOD PRESSURE: 122 MMHG | WEIGHT: 145.5 LBS | BODY MASS INDEX: 23.38 KG/M2 | HEIGHT: 66 IN

## 2021-11-17 DIAGNOSIS — Z98.84 S/P BARIATRIC SURGERY: ICD-10-CM

## 2021-11-17 DIAGNOSIS — R10.11 RUQ ABDOMINAL PAIN: Primary | ICD-10-CM

## 2021-11-17 DIAGNOSIS — K90.9 INTESTINAL MALABSORPTION, UNSPECIFIED TYPE: ICD-10-CM

## 2021-11-17 PROBLEM — B37.31 VAGINAL CANDIDIASIS: Status: RESOLVED | Noted: 2019-03-08 | Resolved: 2021-11-17

## 2021-11-17 PROCEDURE — 99214 OFFICE O/P EST MOD 30 MIN: CPT | Performed by: SPECIALIST

## 2021-11-17 NOTE — PATIENT INSTRUCTIONS
Patient Instructions      1. Remember hydration goals - minimum of 64 ounces of liquids per day (dehydration is the number one reason for hospital readmission). 2. Continue to monitor carbohydrate and protein intake you need a minimum of  Grams of protein daily- remember to keep your total carbohydrates to 50 grams or less per day for best results. 3. Continue to work towards exercise goals - 60-90 minutes, 5 times a week minimum of deliberate, aerobic exercise is the ultimate goal with strength training 2 times each week. Refer to [employer] for  information. 4. Remember to take vitamins as directed. 5. Attend support group the 2nd Thursday of each month. 6.  Constipation: Milk of Magnesia is for immediate relief only. Miralax is to be used every day if constipation is a chronic problem. 7.  Diarrhea: patients will occasionally develop lactose intolerance after surgery. Check to see if your protein shake has whey in it. If it does try a protein powder or drink that does not have whey and stop all yogurts, cheeses and milks to see if the diarrhea goes away. 8.  If you have had labs drawn. We will only call you if you have abnormal results. Otherwise you can access the lab results in \"mychart\". You will only need the access code the first time you sign on. 9.  Call us at [phone] or email us through [email or web address] [email or web address]" with questions,     concerns or worsening of condition, we have someone on call 24 hours a day. If you are unable to reach our office, you are to go to your Primary Care Physician or the Emergency Department.      NOTE TO GASTRIC BYPASS PATIENTS:  (SAME APPLIES TO GASTRIC SLEEVE PATIENTS FOR FIRST TWO MONTHS)  Remember that for the rest of your life, you are not able to take the following:  - NSAIDs (ibuprofen, goody powder, BC powder, Motrin, Advil, Mobic, Voltaren, Excedrin, etc.)  - Steroid pills or injections  - Smoke (cigarettes or recreational drugs)  - Alcohol  Use of any of the above may cause ulcers in your stomach which may perforate causing a medical emergency and surgery. Speak to our medical staff if another medical provider requires you to take steroids or NSAIDs. Supplement Resource Guide    Importance of Protein:   Maintains lean body mass, produces antibodies to fight off infections, heals wounds, minimizes hair loss, helps to give you energy, helps with satiety, and keeping you full between meals. Importance of Calcium:  Needed for healthy bones and teeth, normal blood clotting, and nervous system functioning, higher risk of osteoporosis and bone disease with non-compliance. Importance of Multivitamins: Many functions. Supply you with extra nutrients that you may be missing from food. May lead to iron deficiency anemia, weakness, fatigue, and many other symptoms with non-compliance. Importance of B Vitamins:  Important for red blood cell formation, metabolism, energy, and helps to maintain a healthy nervous system. Protein Supplement  Find one you like now. Use immediately after surgery. Look for:  35-50g protein each day from your protein supplement once you reach the progression diet. 0-3 g fat per serving  0-3 g sugar per serving    Protein drinks should be split in separate dosages. Recommend: Lifelong  1 year + Calcium Supplement:     Start taking within a month after surgery. Look for: Calcium Citrate Plus D (1500 mg per day)  Recommend: Citracal     .            Avoid chocolate chewable calcium. Can use chewable bariatric or GNC brand or similar chewable. The body cannot absorb more than 500-600 mg of calcium at a time. Take for Life Multi-vitamin Supplement:      Start immediately after surgery: any complete chewable, such as: [place]s Complete chewables. Avoid [place] sours or gummies.   They lack iron and other important nutrients and also have added sugar. Continue with chewable vitamin or change to adult complete multivitamin one month after surgery. Menstruating women can take a prenatal vitamin. Make sure has at least 18 mg iron and 228-116 mcg folic acid   Vitamin K42, B Complex Vitamin, and Biotin  Start taking within a month after surgery. Vitamin B12:  1000 mcg of Vitamin B12 three times weekly    Must take sublingually (meaning you take it under your tongue) or in a liquid drop form for easy absorption. B Complex Vitamin: Take a pill or liquid drop form once daily. Biotin: This vitamin can help prevent hair loss. Recommend 5mg   (5000 mcg) a day  Biotin is Optional              Learning About Being Physically Active  What is physical activity? Being physically active means doing any kind of activity that gets your body moving. The types of physical activity that can help you get fit and stay healthy include:  · Aerobic or \"cardio\" activities. These make your heart beat faster and make you breathe harder, such as brisk walking, riding a bike, or running. They strengthen your heart and lungs and build up your endurance. · Strength training activities. These make your muscles work against, or \"resist,\" something. Examples include lifting weights or doing push-ups. These activities help tone and strengthen your muscles and bones. · Stretches. These let you move your joints and muscles through their full range of motion. Stretching helps you be more flexible. What are the benefits of being active? Being active is one of the best things you can do for your health. It helps you to:  · Feel stronger and have more energy to do all the things you like to do. · Focus better at school or work. · Feel, think, and sleep better. · Reach and stay at a healthy weight. · Lose fat and build lean muscle.   · Lower your risk for serious health problems, including diabetes, heart attack, high blood pressure, and some cancers. · Keep your heart, lungs, bones, muscles, and joints strong and healthy. How can you make being active part of your life? Start slowly. Make it your long-term goal to get at least 30 minutes of exercise on most days of the week. Walking is a good choice. You also may want to do other activities, such as running, swimming, cycling, or playing tennis or team sports. Pick activities that you like--ones that make your heart beat faster, your muscles stronger, and your muscles and joints more flexible. If you find more than one thing you like doing, do them all. You don't have to do the same thing every day. Get your heart pumping every day. Any activity that makes your heart beat faster and keeps it at that rate for a while counts. Here are some great ways to get your heart beating faster:  · Go for a brisk walk, run, or bike ride. · Go for a hike or swim. · Go in-line skating. · Play a game of touch football, basketball, or soccer. · Ride a bike. · Play tennis or racquetball. · Climb stairs. Even some household chores can be aerobic--just do them at a faster pace. Vacuuming, raking or mowing the lawn, sweeping the garage, and washing and waxing the car all can help get your heart rate up. Strengthen your muscles during the week. You don't have to lift heavy weights or grow big, bulky muscles to get stronger. Doing a few simple activities that make your muscles work against, or \"resist,\" something can help you get stronger. For example, you can:  · Do push-ups or sit-ups, which use your own body weight as resistance. · Lift weights or dumbbells or use stretch bands at home or in a gym or community center. Stretch your muscles often. Stretching will help you as you become more active. It can help you stay flexible, loosen tight muscles, and avoid injury. It can also help improve your balance and posture and can be a great way to relax.   Be sure to stretch the muscles you'll be using when you work out. It's best to warm your muscles slightly before you stretch them. Walk or do some other light aerobic activity for a few minutes, and then start stretching. When you stretch your muscles:  · Do it slowly. Stretching is not about going fast or making sudden movements. · Don't push or bounce during a stretch. · Hold each stretch for at least 15 to 30 seconds, if you can. You should feel a stretch in the muscle, but not pain. · Breathe out as you do the stretch. Then breathe in as you hold the stretch. Don't hold your breath. If you're worried about how more activity might affect your health, have a checkup before you start. Follow any special advice your doctor gives you for getting a smart start. Where can you learn more? Go to http://www.gray.com/  Enter [id number] in the search box to learn more about \"Learning About Being Physically Active. \"  Current as of: May 12, 2021               Content Version: 13.0  © 2006-2021 [employer]. Care instructions adapted under license by [employer] (which disclaims liability or warranty for this information). If you have questions about a medical condition or this instruction, always ask your healthcare professional. [address] any warranty or liability for your use of this information.

## 2022-03-18 PROBLEM — R11.0 POSTPRANDIAL NAUSEA: Status: ACTIVE | Noted: 2018-02-21

## 2022-03-19 PROBLEM — R10.11 RUQ ABDOMINAL PAIN: Status: ACTIVE | Noted: 2018-02-21

## 2022-07-05 ENCOUNTER — DOCUMENTATION ONLY (OUTPATIENT)
Dept: SURGERY | Age: 47
End: 2022-07-05

## 2022-07-05 NOTE — PROGRESS NOTES
Per [facility] requirements;  E-mail and letter sent for follow up appointment. [address] [facility] Surgical Specialist  [address] [facility], [address] [facility]                 [address] [place] [address] Weight Loss [place]  [facility]  [facility]      Dear Patient,    Your health is our main concern. It is important for your health to have follow-up lab work and to see your surgeon at 3 months, 6 months, 9 months and annually after your weight loss surgery. Additionally, the Department of Bariatric Surgery at our hospital is a member of the Metabolic and Bariatric Surgery Accreditation and Quality Improvement Program [facility]). As a participant in this program, we gather information on the outcomes of our patients after surgery. Please call the office for a follow up appointment at [phone]. If you have moved out of the area or have changed surgeons please call us and let us know the name of your doctor. Your health and feedback are important to us. We greatly appreciate your response.        Thank you,  [facility] [address] [facility]

## 2023-07-19 NOTE — NURSE NAVIGATOR
Per [employer] requirements:  Letter and email sent requesting follow up appointment. [employer] [facility]ist  [facility]   [address], [address] [facility] [employer] Weight [facility] [place]  [facility]  [facility]      Dear Patient,    Your health is our main concern. It is important for your health to have follow-up lab work and to see your surgeon at 3 months, 6 months, 9 months and annually after your weight loss surgery. Additionally, the Department of Bariatric Surgery at our hospital is a member of the Metabolic and Bariatric Surgery Accreditation and Quality Improvement Program [facility]). As a participant in this program, we gather information on the outcomes of our patients after surgery. Please call the office for a follow up appointment at [phone]. If you have moved out of the area or have changed surgeons please call us and let us know the name of your doctor. Your health and feedback are important to us. We greatly appreciate your response.        Thank you,  [employer] [facility] [place] [facility] [address]